# Patient Record
Sex: FEMALE | URBAN - NONMETROPOLITAN AREA
[De-identification: names, ages, dates, MRNs, and addresses within clinical notes are randomized per-mention and may not be internally consistent; named-entity substitution may affect disease eponyms.]

---
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
## 2020-09-24 ENCOUNTER — IMPORTED ENCOUNTER (OUTPATIENT)
Dept: URBAN - NONMETROPOLITAN AREA CLINIC 1 | Facility: CLINIC | Age: 20
End: 2020-09-24

## 2020-09-24 PROCEDURE — 92015 DETERMINE REFRACTIVE STATE: CPT

## 2020-09-24 PROCEDURE — 92310 CONTACT LENS FITTING OU: CPT

## 2020-09-24 PROCEDURE — 92014 COMPRE OPH EXAM EST PT 1/>: CPT

## 2020-09-24 NOTE — PATIENT DISCUSSION
Simple Myopia OD/Compound Myopic Astigmatism OS-  discussed findings w/patient-  new spectacle/CL Rx issued-  continue to monitor yearly or prn; 's Notes: MR 9/24/2020DFE 9/24/2020

## 2021-08-30 ENCOUNTER — IMPORTED ENCOUNTER (OUTPATIENT)
Dept: URBAN - NONMETROPOLITAN AREA CLINIC 1 | Facility: CLINIC | Age: 21
End: 2021-08-30

## 2021-08-30 PROBLEM — H52.222: Noted: 2021-08-30

## 2021-08-30 PROBLEM — H52.13: Noted: 2021-08-30

## 2021-08-30 PROBLEM — B02.39: Noted: 2021-08-30

## 2021-08-30 PROCEDURE — 99213 OFFICE O/P EST LOW 20 MIN: CPT

## 2021-08-30 NOTE — PATIENT DISCUSSION
MEGHAN OS-  discussed findings w/patient-  start Medrol dose tonia as directed-  continue Valtrex as directed-  continue ointment as directed-  RTC 1-2 weeks f/u or prn; 's Notes: MR 9/24/2020DFE 9/24/2020

## 2021-09-28 ENCOUNTER — IMPORTED ENCOUNTER (OUTPATIENT)
Dept: URBAN - NONMETROPOLITAN AREA CLINIC 1 | Facility: CLINIC | Age: 21
End: 2021-09-28

## 2021-09-28 PROBLEM — B02.39: Noted: 2021-09-28

## 2021-09-28 PROBLEM — H52.13: Noted: 2021-09-28

## 2021-09-28 PROBLEM — H52.222: Noted: 2021-09-28

## 2021-09-28 PROCEDURE — 92310 CONTACT LENS FITTING OU: CPT

## 2021-09-28 PROCEDURE — 92015 DETERMINE REFRACTIVE STATE: CPT

## 2021-09-28 PROCEDURE — 92014 COMPRE OPH EXAM EST PT 1/>: CPT

## 2022-04-09 ASSESSMENT — VISUAL ACUITY
OU_CC: 20/20
OD_SC: 20/30
OD_SC: 20/30+2
OD_SC: 20/30
OS_SC: 20/20
OS_SC: 20/25
OS_PH: 20/80
OU_CC: 20/20
OS_SC: 20/20
OD_SC: 20/20
OU_SC: 20/20
OS_SC: 20/400
OU_SC: 20/20
OD_SC: 20/20

## 2022-04-09 ASSESSMENT — TONOMETRY
OD_IOP_MMHG: 18
OS_IOP_MMHG: 17
OD_IOP_MMHG: 17
OS_IOP_MMHG: 16
OD_IOP_MMHG: 17
OS_IOP_MMHG: 18
OD_IOP_MMHG: 16
OS_IOP_MMHG: 17

## 2023-02-27 NOTE — PATIENT DISCUSSION
Simple Myopia OD/Compound Myopic Astigmatism OS-  discussed findings w/patient-  no changes noted at this time-  new spectacle/CL Rx issued-  continue to monitor yearly or prn; 's Notes:  9/28/2021DFE 9/28/2021 normal...